# Patient Record
Sex: FEMALE | URBAN - METROPOLITAN AREA
[De-identification: names, ages, dates, MRNs, and addresses within clinical notes are randomized per-mention and may not be internally consistent; named-entity substitution may affect disease eponyms.]

---

## 2018-03-22 ENCOUNTER — APPOINTMENT (RX ONLY)
Dept: URBAN - METROPOLITAN AREA CLINIC 20 | Facility: CLINIC | Age: 38
Setting detail: DERMATOLOGY
End: 2018-03-22

## 2018-03-22 DIAGNOSIS — L65.9 NONSCARRING HAIR LOSS, UNSPECIFIED: ICD-10-CM

## 2018-03-22 PROCEDURE — 99202 OFFICE O/P NEW SF 15 MIN: CPT

## 2018-03-22 PROCEDURE — ? COUNSELING

## 2018-03-22 PROCEDURE — ? ORDER TESTS

## 2018-03-22 ASSESSMENT — LOCATION ZONE DERM: LOCATION ZONE: SCALP

## 2018-03-22 ASSESSMENT — LOCATION SIMPLE DESCRIPTION DERM: LOCATION SIMPLE: SCALP

## 2018-03-22 ASSESSMENT — LOCATION DETAILED DESCRIPTION DERM: LOCATION DETAILED: RIGHT SUPERIOR FRONTAL SCALP

## 2018-03-22 NOTE — PROCEDURE: COUNSELING
Detail Level: Zone
Patient Specific Counseling (Will Not Stick From Patient To Patient): Most likely pt has female-pattern hair loss.  No family history of this that she knows of.  Last labs were done 6-7 months ago so she would prefer to have them re-done.  Recommended minoxidil OTC for now; discussed side effects.  If labs are negative will do a scalp biopsy to confirm the dx and consider spironolactone for the future. \\n Pt may continue her Biotin and Prenatal vitamins.

## 2018-03-22 NOTE — HPI: HAIR LOSS
How Did The Hair Loss Occur?: gradual in onset
How Severe Is Your Hair Loss?: moderate
What Hair Products Do You Use?: Big sexy hair

## 2018-04-23 ENCOUNTER — APPOINTMENT (RX ONLY)
Dept: URBAN - METROPOLITAN AREA CLINIC 20 | Facility: CLINIC | Age: 38
Setting detail: DERMATOLOGY
End: 2018-04-23

## 2018-04-23 DIAGNOSIS — L65.9 NONSCARRING HAIR LOSS, UNSPECIFIED: ICD-10-CM | Status: UNCHANGED

## 2018-04-23 DIAGNOSIS — L21.8 OTHER SEBORRHEIC DERMATITIS: ICD-10-CM

## 2018-04-23 PROCEDURE — ? PRESCRIPTION

## 2018-04-23 PROCEDURE — ? COUNSELING

## 2018-04-23 PROCEDURE — 99213 OFFICE O/P EST LOW 20 MIN: CPT

## 2018-04-23 RX ORDER — SPIRONOLACTONE 100 MG/1
TABLET, FILM COATED ORAL
Qty: 30 | Refills: 2 | Status: ERX | COMMUNITY
Start: 2018-04-23

## 2018-04-23 RX ORDER — KETOCONAZOLE 20.5 MG/ML
SHAMPOO, SUSPENSION TOPICAL
Qty: 1 | Refills: 11 | Status: ERX | COMMUNITY
Start: 2018-04-23

## 2018-04-23 RX ADMIN — KETOCONAZOLE: 20.5 SHAMPOO, SUSPENSION TOPICAL at 17:22

## 2018-04-23 RX ADMIN — SPIRONOLACTONE: 100 TABLET, FILM COATED ORAL at 17:23

## 2018-04-23 ASSESSMENT — LOCATION DETAILED DESCRIPTION DERM
LOCATION DETAILED: RIGHT SUPERIOR PARIETAL SCALP
LOCATION DETAILED: RIGHT SUPERIOR FRONTAL SCALP

## 2018-04-23 ASSESSMENT — LOCATION ZONE DERM: LOCATION ZONE: SCALP

## 2018-04-23 ASSESSMENT — LOCATION SIMPLE DESCRIPTION DERM: LOCATION SIMPLE: SCALP

## 2018-04-23 NOTE — PROCEDURE: COUNSELING
Patient Specific Counseling (Will Not Stick From Patient To Patient): Most likely pt has female-pattern hair loss.  No family history of this that she knows of.  She has noticed that her ponytail is thinner.  Continue minoxidil 5% BID OTC; previously discussed side effects.  Continue Biotin and Prenatal vitamins.  Alopecia labs from 3/23/28 came back with TSH/ESR/Ferritin WNL (ferritin >50), CBC ok, PARIS neg and iron 38L ().  Discussed with pt that she should have her PCP look over her iron values and see if they would consider supplementing for this (JUN Chavez with [facility] on [address] in [place], [place]) because I usually only supplement if the ferritin (which measures iron stores) is <50.  Pt declines a scalp biopsy to confirm the dx at this time.  Pt states that she was + for BRCA 1/2 and she is going to see a  for this soon- we discussed getting the ok from them to use the spironolactone before use due to the black box warning (do not take until they give the ok).
Detail Level: Zone

## 2018-06-07 ENCOUNTER — TELEPHONE (OUTPATIENT)
Dept: HEMATOLOGY ONCOLOGY | Facility: MEDICAL CENTER | Age: 38
End: 2018-06-07

## 2018-06-07 NOTE — TELEPHONE ENCOUNTER
Patient called regarding referral for Dr. Chambers. She stated that Karina Daly over at Dr. Yan's office has been trying to send a referral over to our office for about a month now and has been getting the turn around that we have not yet received a referral. I went ahead and called my [facility] center which is the latest referral as patient stated. I left a voicemail for Letty Arredondo-JEFFY for Karina Daly hopefully she gives me a call back today to figure out what's exactly going on with the referral.

## 2018-07-10 ENCOUNTER — OFFICE VISIT (OUTPATIENT)
Dept: HEMATOLOGY ONCOLOGY | Facility: MEDICAL CENTER | Age: 38
End: 2018-07-10
Payer: COMMERCIAL

## 2018-07-10 VITALS
DIASTOLIC BLOOD PRESSURE: 80 MMHG | TEMPERATURE: 97.5 F | OXYGEN SATURATION: 96 % | BODY MASS INDEX: 26.17 KG/M2 | WEIGHT: 162.15 LBS | HEART RATE: 80 BPM | RESPIRATION RATE: 16 BRPM | SYSTOLIC BLOOD PRESSURE: 102 MMHG

## 2018-07-10 DIAGNOSIS — Z80.3 FAMILY HISTORY OF BREAST CANCER: ICD-10-CM

## 2018-07-10 DIAGNOSIS — Z80.41 FAMILY HISTORY OF OVARIAN CANCER: ICD-10-CM

## 2018-07-10 DIAGNOSIS — Z15.01 BREAST CANCER GENETIC SUSCEPTIBILITY: ICD-10-CM

## 2018-07-10 PROCEDURE — 99242 OFF/OP CONSLTJ NEW/EST SF 20: CPT | Performed by: MEDICAL GENETICS

## 2018-07-10 ASSESSMENT — PAIN SCALES - GENERAL: PAINLEVEL: NO PAIN

## 2018-07-10 NOTE — PROGRESS NOTES
July 10, 2018  Harriett Mckeon NP  [facility]  Re: Mary Ellen Talbot    Dear Ms Mike  I had the pleasure of meeting with your patient Mary Ellen Talbot today. As you remember, Ms Talbot was referred for evaluation with a positive BRCA1 pathologic mutation. Her testing was prompted by her mother’s diagnosis and testing results. Next Generation Sequencing results (mcTEL: pathogenic mutation identified in the BRCA1 gene: [id number]).    This pathogenic variant has been associated with increased risk of breast, ovary and other cancers (including male prostate, pancreatic and melanoma).  I took a complete family history and identified family members (sibs, half sibs, nieces, nephews and children) at risk. We reviewed the currently available family testing programs. We also reviewed current options in preimplantation diagnosis to avoid passage of the BRCA1 variant to offspring.  Mary Ellen was encouraged to continue her already initiated work with breast (Francis) and ovarian ([facility]uriy/Sharad) health specialists.   Subsequent management should follow recommended NCCN guidelines and will be initiated.  Thank you for your support  Sincerely yours,  KEZIA Chambers MD PhD       A total of 20 minutes of face to face discussion took place, the majority of which related to data gathering and management planning.    Begin: 1: 15  End 1: 35    [id number]